# Patient Record
Sex: FEMALE
[De-identification: names, ages, dates, MRNs, and addresses within clinical notes are randomized per-mention and may not be internally consistent; named-entity substitution may affect disease eponyms.]

---

## 2021-04-25 ENCOUNTER — HOSPITAL ENCOUNTER (INPATIENT)
Dept: HOSPITAL 5 - LD | Age: 41
LOS: 1 days | Discharge: HOME | End: 2021-04-26
Attending: OBSTETRICS & GYNECOLOGY | Admitting: OBSTETRICS & GYNECOLOGY
Payer: COMMERCIAL

## 2021-04-25 DIAGNOSIS — Z20.822: ICD-10-CM

## 2021-04-25 DIAGNOSIS — Z3A.36: ICD-10-CM

## 2021-04-25 DIAGNOSIS — Z90.49: ICD-10-CM

## 2021-04-25 DIAGNOSIS — Z80.49: ICD-10-CM

## 2021-04-25 LAB
BAND NEUTROPHILS # (MANUAL): 0.2 K/MM3
HCT VFR BLD CALC: 37.4 % (ref 30.3–42.9)
HCT VFR BLD CALC: 39.8 % (ref 30.3–42.9)
HGB BLD-MCNC: 12.8 GM/DL (ref 10.1–14.3)
HGB BLD-MCNC: 13.1 GM/DL (ref 10.1–14.3)
MCHC RBC AUTO-ENTMCNC: 33 % (ref 30–34)
MCHC RBC AUTO-ENTMCNC: 34 % (ref 30–34)
MCV RBC AUTO: 88 FL (ref 79–97)
MCV RBC AUTO: 89 FL (ref 79–97)
MYELOCYTES # (MANUAL): 0 K/MM3
PLATELET # BLD: 202 K/MM3 (ref 140–440)
PLATELET # BLD: 220 K/MM3 (ref 140–440)
PROMYELOCYTES # (MANUAL): 0 K/MM3
RBC # BLD AUTO: 4.26 M/MM3 (ref 3.65–5.03)
RBC # BLD AUTO: 4.47 M/MM3 (ref 3.65–5.03)
TOTAL CELLS COUNTED BLD: 100

## 2021-04-25 PROCEDURE — 85014 HEMATOCRIT: CPT

## 2021-04-25 PROCEDURE — 86901 BLOOD TYPING SEROLOGIC RH(D): CPT

## 2021-04-25 PROCEDURE — U0003 INFECTIOUS AGENT DETECTION BY NUCLEIC ACID (DNA OR RNA); SEVERE ACUTE RESPIRATORY SYNDROME CORONAVIRUS 2 (SARS-COV-2) (CORONAVIRUS DISEASE [COVID-19]), AMPLIFIED PROBE TECHNIQUE, MAKING USE OF HIGH THROUGHPUT TECHNOLOGIES AS DESCRIBED BY CMS-2020-01-R: HCPCS

## 2021-04-25 PROCEDURE — 85027 COMPLETE CBC AUTOMATED: CPT

## 2021-04-25 PROCEDURE — 76816 OB US FOLLOW-UP PER FETUS: CPT

## 2021-04-25 PROCEDURE — 85007 BL SMEAR W/DIFF WBC COUNT: CPT

## 2021-04-25 PROCEDURE — 86592 SYPHILIS TEST NON-TREP QUAL: CPT

## 2021-04-25 PROCEDURE — 83036 HEMOGLOBIN GLYCOSYLATED A1C: CPT

## 2021-04-25 PROCEDURE — 82962 GLUCOSE BLOOD TEST: CPT

## 2021-04-25 PROCEDURE — 36415 COLL VENOUS BLD VENIPUNCTURE: CPT

## 2021-04-25 PROCEDURE — 86900 BLOOD TYPING SEROLOGIC ABO: CPT

## 2021-04-25 PROCEDURE — 76815 OB US LIMITED FETUS(S): CPT

## 2021-04-25 PROCEDURE — 90715 TDAP VACCINE 7 YRS/> IM: CPT

## 2021-04-25 PROCEDURE — 86850 RBC ANTIBODY SCREEN: CPT

## 2021-04-25 PROCEDURE — 90471 IMMUNIZATION ADMIN: CPT

## 2021-04-25 PROCEDURE — 85018 HEMOGLOBIN: CPT

## 2021-04-25 PROCEDURE — 0HQ9XZZ REPAIR PERINEUM SKIN, EXTERNAL APPROACH: ICD-10-PCS | Performed by: OBSTETRICS & GYNECOLOGY

## 2021-04-25 PROCEDURE — 85025 COMPLETE CBC W/AUTO DIFF WBC: CPT

## 2021-04-25 RX ADMIN — IBUPROFEN SCH MG: 600 TABLET, FILM COATED ORAL at 23:45

## 2021-04-25 RX ADMIN — FERROUS SULFATE TAB 325 MG (65 MG ELEMENTAL FE) SCH MG: 325 (65 FE) TAB at 23:45

## 2021-04-25 RX ADMIN — DOCUSATE SODIUM SCH MG: 100 CAPSULE, LIQUID FILLED ORAL at 23:46

## 2021-04-25 NOTE — HISTORY AND PHYSICAL REPORT
History of Present Illness


Date of examination: 21


Date of admission: 


21 10:42





Chief complaint: 


Leaking of fluid from vagina





History of present illness: 


40 year old female  presents to L&D with complaint of leaking of fluid 

from vagina and contractions. 


Patient received prenatal care at [facility]. Prenatal records are 

available. 


LMP 8/15/2020. EDC 2021.


Pregnancy significant for the following: AMA, obesity, gestational diabetes, LG

A. 


Prenatal labs are as follows: A+, antibody screen negative, rubella immune, 

hepatitis B surface antigen negative, HIV negative, RPR nonreactive, gonorrhea 

negative, chlamydia negative, GBS unknown, 1 hour sugar test 167 (3 hour OGTT 

71, 192, 164, 126), AFP negative, SMA negative, cystic fibrosis negative, 

fragile X negative. 








Past History


Past Medical History: other (obesity)


Past Surgical History: cholecystectomy


GYN History: denies: abnormal PAP smear, chlamydia, gonorrhea, hepatitis B, 

hepatitis C, herpes, HIV, syphilis, trichomonas


Family/Genetic History: cancer (sister had uterine cancer)


Social history: lives with family, full code.  denies: smoking, alcohol abuse, 

prescription drug abuse, IV drug use





- Obstetrical History


Expected Date of Delivery: 21


Actual Gestation: 36 Week(s) 1 Day(s) 


: 3


Para: 2


Hx # Term Pregnancies: 2


Number of  Pregnancies: 0


Spontaneous Abortions: 0


Induced : 0


Number of Living Children: 2





Medications and Allergies


                                    Allergies











Allergy/AdvReac Type Severity Reaction Status Date / Time


 


No Known Allergies Allergy   Unverified 21 11:32











                                Home Medications











 Medication  Instructions  Recorded  Confirmed  Last Taken  Type


 


No Known Home Medications [No  21 Unknown History





Reported Home Medications]     











Active Meds: 


Active Medications





Ephedrine Sulfate (Ephedrine Sulfate 50 Mg/1 Ml Inj)  10 mg IV Q2M PRN


   PRN Reason: Hypotension


Fentanyl (Fentanyl 100 Mcg/2 Ml Inj)  100 mcg IV Q2H PRN


   PRN Reason: Pain,Severe (7-10) LABOR PAIN


Oxytocin/Sodium Chloride (Pitocin/Ns 30 Unit/500ml)  30 units in 500 mls @ 2 

mls/hr IV TITR ALESSIO; Protocol


Lactated Ringer's (Lactated Ringers)  1,000 mls @ 125 mls/hr IV AS DIRECT ALESSIO


Oxytocin/Sodium Chloride (Pitocin/Ns 30 Unit/500ml)  30 units in 500 mls @ 40 

mls/hr IV TITR ALESSIO; Protocol


Ampicillin Sodium (Ampicillin/Ns 2 Gm/100 Ml)  2 gm in 100 mls @ 100 mls/hr IV 

ONCE ONE; Protocol


   Stop: 21 12:17


Ampicillin Sodium (Ampicillin/Ns 1 Gm/50 Ml)  1 gm in 50 mls @ 100 mls/hr IV Q4H

ALESSIO; Protocol


Lidocaine (Lidocaine (2%) 20 Mg/1 Ml Vial 20 Ml Mdv)  20 ml INFILTRATI ONCE ONE


   Stop: 21 11:19


Mineral Oil (Mineral Oil 30 Ml Oral Liqd)  30 ml PO QHS PRN


   PRN Reason: Constipation


Terbutaline Sulfate (Terbutaline 1 Mg/1 Ml Inj)  0.25 mg SUB-Q ONCE PRN


   PRN Reason: Hyperstimulation/Hypertonicity











Review of Systems


All systems: negative (leaking of clear fluid from vagina, uterine contractions)





- Vital Signs


Vital signs: 


                                   Vital Signs











Pulse BP


 


 73   120/72 


 


 21 10:56  21 10:56








                                        











Temp Pulse Resp BP Pulse Ox


 


    85      120/72   96 


 


    21 11:19     21 10:56  21 11:19














- Physical Exam


Abdomen: Positive: normal appearance, soft.  Negative: distention, tenderness, 

guarding, rigidity


Genitourinary (Female): Positive: normal external genitalia, normal perenium.  

Negative: perineal/vulvar lesions


Vagina: Positive: other (clear discharge)


Uterus: Positive: enlarged.  Negative: tender


Anus/Rectum: Positive: normal perianal skin


Extremities: Positive: normal





- Obstetrical


FHR: category 1


Uterine Contraction Monitor Mode: External


Cervical Dilatation: 5


Cervical Effacement Percentage: 80


Fetal station: -4


Uterine Contraction Pattern: Regular


Uterine Contraction Intensity: Moderate





Results


Result Diagrams: 


                                 21 11:25





All other labs normal.








Assessment and Plan


A: Pregnancy at 36 weeks, 1 day gestation. 


Spontaneous rupture of membranes. 


GBS unknown.


Early labor. 


GDM.


Obesity.


AMA.


P: Admit.


Continuous EFM.


GBS prophylaxis. 


Blood sugars every 2 hours.


Pitocin augmentation of labor.

## 2021-04-25 NOTE — PROCEDURE NOTE
OB Delivery Note





- Delivery


Date of Delivery: 21


Surgeon: ИВАН MAN


Estimated blood loss: other (300 cc)





- Vaginal


Delivery presentation: vertex


Delivery position: OA


Intrapartum events: none


Delivery induction: none


Delivery augmentation: pitocin


Delivery monitor: external FHT, external uterine


Route of delivery: 


Delivery placenta: spontaneous


Delivery cord: 3 umbilical vessels


Episiotomy: none


Delivery laceration: 1st degree


Delivery repair: vicryl


Anesthesia: none


Delivery comments: 


Spontaneous vaginal delivery at 19:20 of liveborn male infant weighing 6 lb. 13 

oz. over first degree perineal laceration with apgars of 8/9. Birth was 

atraumatic; nuchal cord times 1, manually reduced. Patient was placed skin to 

skin with mom immediately after delivery. Spontaneous cry and respirations. Baby

was suctioned with bulb syringe and dried with warm towels. 3 vessel cord double

clamped and cut. Spontaneous delivery of intact placenta and membranes.  

cc. Pitocin to IV fluids after delivery of placenta. Fundus firm and midline. 

Cytotec 800 mcg given rectally due to period of uterine atony. Small first 

degree perineal laceration repaired with 3-0 vicryl. No other lacerations noted.

Vaginal sweep negative. Sponge count correct. Mother and baby stable.

## 2021-04-25 NOTE — ULTRASOUND REPORT
ULTRASOUND OBSTETRIC 



INDICATION / CLINICAL INFORMATION: Estimated fetal weight.

Clinical Gestational Age (GA) in weeks, days: 36, 1 



TECHNIQUE: Transabdominal.



COMPARISON: None available.



FINDINGS:

Single intrauterine pregnancy. 



Biparietal Diameter = 8.8 cm = 35, 2 weeks, days

Head Circumference = 33.0 cm = 37, 4 weeks, days

Abdominal Circumference = 33.3 cm = 37, 2 weeks, days

Femur Length = 6.2 cm = 32, 2 weeks, days

Average Ultrasound Age (AUA) = 35, 4 weeks, days



Fetal Heart Rate: 149  beats per minute. 

Estimated Fetal Birth Weight in grams (if calculated): 2781

Estimated Fetal Weight Growth Percentile (if calculated): 43%



Fetal Position: cephalic. 

Cervix: Not visualized.  Length in cm (if measured): Not measured

Placenta: Not evaluated 



IMPRESSION:

1. Single, living intrauterine pregnancy with estimated sonographic age of 35, 4 weeks, days.

2. Estimated fetal weight is 2781 g with 43 percentile.



Signer Name: ODALYS Aponte MD 

Signed: 4/25/2021 2:32 PM

Workstation Name: [id number]

## 2021-04-26 VITALS — DIASTOLIC BLOOD PRESSURE: 64 MMHG | SYSTOLIC BLOOD PRESSURE: 117 MMHG

## 2021-04-26 LAB
HCT VFR BLD CALC: 34.6 % (ref 30.3–42.9)
HGB BLD-MCNC: 11.5 GM/DL (ref 10.1–14.3)

## 2021-04-26 RX ADMIN — IBUPROFEN SCH MG: 600 TABLET, FILM COATED ORAL at 18:15

## 2021-04-26 RX ADMIN — FERROUS SULFATE TAB 325 MG (65 MG ELEMENTAL FE) SCH MG: 325 (65 FE) TAB at 18:15

## 2021-04-26 RX ADMIN — DOCUSATE SODIUM SCH MG: 100 CAPSULE, LIQUID FILLED ORAL at 18:15

## 2021-04-26 RX ADMIN — IBUPROFEN SCH MG: 600 TABLET, FILM COATED ORAL at 05:44

## 2021-04-26 NOTE — DISCHARGE SUMMARY
Providers





- Providers


Date of Admission: 


21 10:42





Date of discharge: 21


Attending physician: 


LILLIAM PHILIPPE MD





Primary care physician: 


LILLIAM PHILIPPE MD








Hospitalization


Reason for admission: rupture of membranes


Delivery: 


Episiotomy: none


Laceration: 1st degree


Other postpartum procedures: none


Postpartum complications: none


Discharge diagnosis: IUP at term delivered


 baby: male


Condition at discharge: Good


Disposition: DC-01 TO HOME OR SELFCARE





Plan





- Provider Discharge Summary


Activity: routine, no sex for 6 weeks, no heavy lifting 4 weeks, no strenuous 

exercise


Diet: routine


Instructions: routine


Additional instructions: 


[]  Smoking cessation referral if applicable(refer to patient education folder 

for contact #)


[]  Refer to [facility] Booklet








Call your doctor immediately for:


* Fever > 100.5


* Heavy vaginal bleeding ( >1 pad per hour)


* Severe persistent headache


* Shortness of breath


* Reddened, hot, painful area to leg or breast


* Drainage or odor from incision.





* Keep incision clean and dry at all times and follow doctor's instructions 

regarding bathing/showering











- Follow up plan


Follow up: 


LILLIAM PHILIPPE MD [Primary Care Provider] - 6 Weeks

## 2021-04-26 NOTE — PROGRESS NOTE
Assessment and Plan


A: PP Day #1


     Stable


     GDM A1





P: Follow Routine Postpartum Orders


    Continue GDM Diet


    Depo provera 150mg IM prior to discharge


    D/C home today per patient request


    RTO in Six Weeks








Subjective





- Subjective


Date of service: 21


Patient reports: appetite normal, voiding normally, pain well controlled, 

flatus, bowel movement, ambulating normally


: doing well, bottle feeding (and breastfeeding)





Objective





- Vital Signs


Latest vital signs: 


                                   Vital Signs











  Temp Pulse Resp BP BP Pulse Ox


 


 21 09:00  98.0 F  70  18  124/56   97


 


 21 04:30  97.7 F  61  20   115/59  97


 


 21 00:28  98.2 F  76  18  109/50   93


 


 21 21:20  99.0 F  80  20   123/64  96


 


 21 20:28   90     95


 


 21 20:23   76   127/66   96


 


 21 20:18   75     96


 


 21 20:13   77     96


 


 21 20:08   81   131/73   96


 


 21 20:03   77     95


 


 21 19:58   83     97


 


 21 19:53   85   139/78   96


 


 21 19:48   75     97


 


 21 19:43   86     97


 


 21 19:40   90   155/74  


 


 21 19:39  98.2 F   15   


 


 21 19:38   63     95


 


 21 12:49   78     96


 


 21 12:44   76     96


 


 21 12:39   78     96


 


 21 12:34   83     95


 


 21 12:29   73     95


 


 21 12:24   78     97


 


 21 12:19   86     96


 


 21 12:14   74     96


 


 21 12:09   74     98


 


 21 12:04   75     95


 


 21 11:59   86     96


 


 21 11:54   80     97


 


 21 11:49   74     96








                                Intake and Output











 21





 22:59 06:59 14:59


 


Intake Total 2.267 120 


 


Output Total  550 


 


Balance 2.267 -430 


 


Intake:   


 


  IV 2.267  


 


    PITOCin/NS 30 UNIT/500ML 2.267  





    30 units In 500 ml @ 2   





    mls/hr IV TITR ALESSIO Rx#:   





    [id number]   


 


  Oral  120 


 


Output:   


 


  Urine  550 


 


    Void  550 


 


Other:   


 


  Total, Intake Amount  120 


 


  Total, Output Amount  350 


 


  # Voids   


 


    Void 100  


 


  Estimated Blood Loss 300  














- Exam


Breasts: Present: normal


Cardiovascular: Present: Regular rate


Lungs: Present: Clear to auscultation, Normal air movement


Abdomen: Present: normal appearance, soft, normal bowel sounds


Uterus: Present: normal, firm, fundal height below umbilicus


Extremities: Present: normal





- Labs


Labs: 


                              Abnormal lab results











  21 Range/Units





  16:08 20:51 


 


WBC   12.2 H  (4.5-11.0)  K/mm3


 


Lymphocytes % (Manual)   6.0 L  (13.4-35.0)  %


 


Seg Neutrophils # Man   11.1 H  (1.8-7.7)  K/mm3


 


Lymphocytes # (Manual)   0.7 L  (1.2-5.4)  K/mm3


 


POC Glucose  59 L   ()  mg/dL